# Patient Record
Sex: FEMALE | Race: WHITE | NOT HISPANIC OR LATINO
[De-identification: names, ages, dates, MRNs, and addresses within clinical notes are randomized per-mention and may not be internally consistent; named-entity substitution may affect disease eponyms.]

---

## 2018-01-22 ENCOUNTER — APPOINTMENT (OUTPATIENT)
Dept: ORTHOPEDIC SURGERY | Facility: CLINIC | Age: 68
End: 2018-01-22
Payer: MEDICARE

## 2018-01-22 VITALS — WEIGHT: 163 LBS | HEIGHT: 65 IN | BODY MASS INDEX: 27.16 KG/M2 | RESPIRATION RATE: 16 BRPM

## 2018-01-22 DIAGNOSIS — Z00.00 ENCOUNTER FOR GENERAL ADULT MEDICAL EXAMINATION W/OUT ABNORMAL FINDINGS: ICD-10-CM

## 2018-01-22 DIAGNOSIS — Z86.19 PERSONAL HISTORY OF OTHER INFECTIOUS AND PARASITIC DISEASES: ICD-10-CM

## 2018-01-22 DIAGNOSIS — M19.031 PRIMARY OSTEOARTHRITIS, RIGHT WRIST: ICD-10-CM

## 2018-01-22 DIAGNOSIS — M19.032 PRIMARY OSTEOARTHRITIS, LEFT WRIST: ICD-10-CM

## 2018-01-22 DIAGNOSIS — Z87.19 PERSONAL HISTORY OF OTHER DISEASES OF THE DIGESTIVE SYSTEM: ICD-10-CM

## 2018-01-22 DIAGNOSIS — Z78.9 OTHER SPECIFIED HEALTH STATUS: ICD-10-CM

## 2018-01-22 PROCEDURE — 99203 OFFICE O/P NEW LOW 30 MIN: CPT | Mod: 25

## 2018-01-22 PROCEDURE — 20605 DRAIN/INJ JOINT/BURSA W/O US: CPT | Mod: RT

## 2018-01-22 PROCEDURE — 73110 X-RAY EXAM OF WRIST: CPT | Mod: 50

## 2018-02-05 ENCOUNTER — CLINICAL ADVICE (OUTPATIENT)
Age: 68
End: 2018-02-05

## 2018-08-14 ENCOUNTER — APPOINTMENT (OUTPATIENT)
Dept: ORTHOPEDIC SURGERY | Facility: CLINIC | Age: 68
End: 2018-08-14
Payer: MEDICARE

## 2018-08-14 PROCEDURE — 99214 OFFICE O/P EST MOD 30 MIN: CPT

## 2018-08-30 ENCOUNTER — TRANSCRIPTION ENCOUNTER (OUTPATIENT)
Age: 68
End: 2018-08-30

## 2018-09-12 ENCOUNTER — OUTPATIENT (OUTPATIENT)
Dept: OUTPATIENT SERVICES | Facility: HOSPITAL | Age: 68
LOS: 1 days | Discharge: HOME | End: 2018-09-12

## 2018-09-12 ENCOUNTER — APPOINTMENT (OUTPATIENT)
Dept: ORTHOPEDIC SURGERY | Facility: AMBULATORY SURGERY CENTER | Age: 68
End: 2018-09-12
Payer: MEDICARE

## 2018-09-12 PROCEDURE — 64721 CARPAL TUNNEL SURGERY: CPT | Mod: RT

## 2018-09-12 PROCEDURE — 20600 DRAIN/INJ JOINT/BURSA W/O US: CPT | Mod: F5

## 2018-09-21 ENCOUNTER — APPOINTMENT (OUTPATIENT)
Dept: ORTHOPEDIC SURGERY | Facility: CLINIC | Age: 68
End: 2018-09-21
Payer: MEDICARE

## 2018-09-21 PROCEDURE — 99024 POSTOP FOLLOW-UP VISIT: CPT

## 2018-11-02 ENCOUNTER — APPOINTMENT (OUTPATIENT)
Dept: ORTHOPEDIC SURGERY | Facility: CLINIC | Age: 68
End: 2018-11-02

## 2018-11-16 ENCOUNTER — APPOINTMENT (OUTPATIENT)
Dept: ORTHOPEDIC SURGERY | Facility: CLINIC | Age: 68
End: 2018-11-16
Payer: MEDICARE

## 2018-11-16 PROCEDURE — 99024 POSTOP FOLLOW-UP VISIT: CPT

## 2019-01-30 ENCOUNTER — CLINICAL ADVICE (OUTPATIENT)
Age: 69
End: 2019-01-30

## 2019-02-11 ENCOUNTER — APPOINTMENT (OUTPATIENT)
Dept: ORTHOPEDIC SURGERY | Facility: CLINIC | Age: 69
End: 2019-02-11
Payer: MEDICARE

## 2019-02-11 VITALS — WEIGHT: 163 LBS | BODY MASS INDEX: 27.16 KG/M2 | HEIGHT: 65 IN | RESPIRATION RATE: 16 BRPM

## 2019-02-11 PROCEDURE — 99214 OFFICE O/P EST MOD 30 MIN: CPT | Mod: 25

## 2019-02-11 PROCEDURE — 20526 THER INJECTION CARP TUNNEL: CPT | Mod: LT

## 2020-06-25 ENCOUNTER — FORM ENCOUNTER (OUTPATIENT)
Age: 70
End: 2020-06-25

## 2020-06-28 ENCOUNTER — FORM ENCOUNTER (OUTPATIENT)
Age: 70
End: 2020-06-28

## 2020-06-29 ENCOUNTER — HOSPITAL ENCOUNTER (OUTPATIENT)
Dept: HOSPITAL 74 - FRADUS-SUR | Age: 70
Discharge: HOME | End: 2020-06-29
Attending: SURGERY
Payer: COMMERCIAL

## 2020-06-29 DIAGNOSIS — N60.21: ICD-10-CM

## 2020-06-29 DIAGNOSIS — N60.31: ICD-10-CM

## 2020-06-29 DIAGNOSIS — N63.41: Primary | ICD-10-CM

## 2020-06-29 DIAGNOSIS — N60.81: ICD-10-CM

## 2020-06-29 PROCEDURE — A4648 IMPLANTABLE TISSUE MARKER: HCPCS

## 2020-06-29 PROCEDURE — 0HBT3ZX EXCISION OF RIGHT BREAST, PERCUTANEOUS APPROACH, DIAGNOSTIC: ICD-10-PCS | Performed by: SURGERY

## 2020-06-29 NOTE — OP
DATE OF OPERATION:  06/09/2020

 

PREOPERATIVE DIAGNOSIS:  Right retroareolar nodule.  

 

POSTOPERATIVE DIAGNOSIS:  Right retroareolar nodule.  

 

PROCEDURE:  Right ultrasound guide core biopsy with clip placement.

 

ANESTHESIA:  Local.  

 

ATTENDING SURGEON:  Augustus Maria MD. 

 

ESTIMATED BLOOD LOSS:  Minimal.

 

COMPLICATIONS:  None.

 

DESCRIPTION OF PROCEDURE:  

 

DESCRIPTION OF PROCEDURE:  Patient was made aware of the risks and benefits of the

procedure and consented.  She was placed in supine position.  Under sterile

conditions with 2% lidocaine for local anesthesia, a small nick was made in the skin.

 Using a 13 gauge suction biopsy device, inferolateral approach, under ultrasound

guidance multiple cores were obtained and submitted to pathology.  Likewise under

ultrasound guidance, a U-shaped clip was placed into the biopsy region.  Well

tolerated by the patient.  Steri-Strips and a sterile bandage were then applied. 

Will contact her with results.  

 

 

AUGUSTUS MARIA M.D.

 

[id number]

DD: 06/29/2020 10:57

DT: 06/29/2020 22:52

Job #:  [id number]

## 2020-06-30 NOTE — PATH
Surgical Pathology Report



Patient Name:  GISELE OLMSTEAD

Accession #:  

[facility]. Rec. #:  [id number]                                                        

   /Age/Gender:  1950 (Age: 69) / F

Account:  [id number]                                                          

             Location: [facility]

Taken:  2020

Received:  2020

Reported:  2020

Physicians:  Augustus Maria M.D.

  



Specimen(s) Received

 RIGHT BREAST RETRO CORE BIOPSY 





Clinical History

Nonpalpable lesion

Ultrasound findings: Suspicious







Final Diagnosis

BREAST, RIGHT, RETRO, CORE BIOPSY:

BENIGN BREAST TISSUE SHOWING STROMAL FIBROSIS, FOCAL SCLEROSING ADENOSIS AND

FOCAL USUAL DUCTAL HYPERPLASIA (UDH).





***Electronically Signed***

Katina Wade M.D.





Gross Description

Received in formalin labeled "right breast retro," are 7 tan-yellow, cylindrical

portions of fibroadipose tissue ranging from 0.5-2.0 cm in length and averaging

0.2 cm in diameter. The specimens are submitted in toto in 2 cassettes.

Time to formalin fixation: < 1 minute

Total formalin fixation time: Approximately 8 hours.

/2020



[facility]/2020

## 2021-06-10 ENCOUNTER — TRANSCRIPTION ENCOUNTER (OUTPATIENT)
Age: 71
End: 2021-06-10

## 2021-06-16 DIAGNOSIS — Z87.891 PERSONAL HISTORY OF NICOTINE DEPENDENCE: ICD-10-CM

## 2021-06-28 ENCOUNTER — RESULT REVIEW (OUTPATIENT)
Age: 71
End: 2021-06-28

## 2021-06-28 ENCOUNTER — NON-APPOINTMENT (OUTPATIENT)
Age: 71
End: 2021-06-28

## 2021-06-28 ENCOUNTER — APPOINTMENT (OUTPATIENT)
Dept: BREAST CENTER | Facility: CLINIC | Age: 71
End: 2021-06-28
Payer: MEDICARE

## 2021-06-28 VITALS
BODY MASS INDEX: 25.49 KG/M2 | DIASTOLIC BLOOD PRESSURE: 79 MMHG | WEIGHT: 153 LBS | HEART RATE: 80 BPM | HEIGHT: 65 IN | SYSTOLIC BLOOD PRESSURE: 117 MMHG

## 2021-06-28 DIAGNOSIS — Z80.3 FAMILY HISTORY OF MALIGNANT NEOPLASM OF BREAST: ICD-10-CM

## 2021-06-28 PROCEDURE — 99213 OFFICE O/P EST LOW 20 MIN: CPT

## 2021-06-28 RX ORDER — FAMOTIDINE 10 MG/1
TABLET, FILM COATED ORAL
Refills: 0 | Status: ACTIVE | COMMUNITY

## 2021-06-28 RX ORDER — BACILLUS COAGULANS/INULIN 1B-250 MG
CAPSULE ORAL
Refills: 0 | Status: ACTIVE | COMMUNITY

## 2021-06-28 RX ORDER — ESTRADIOL 10 UG/1
TABLET, FILM COATED VAGINAL
Refills: 0 | Status: ACTIVE | COMMUNITY

## 2021-06-28 NOTE — REVIEW OF SYSTEMS
[Dry Eyes] : dryness of the eyes [Sleep Disturbances] : sleep disturbances [As Noted in HPI] : as noted in HPI [Hot Flashes] : hot flashes [Negative] : Heme/Lymph

## 2021-06-28 NOTE — HISTORY OF PRESENT ILLNESS
[FreeTextEntry1] : Paternal aunt with bilateral postmenopausal breast cancer\par Patient gene tested negative\par Fibrocystic disease\par \par Patient denies any breast masses or nipple discharge.  Patient had a mammogram today the results of which are still pending.  Last year she had a right breast biopsy which showed fibrosis and fibrocystic changes.  In 2005 she had a major duct excision that was benign.  Maternal uncle had breast cancer.

## 2022-07-29 ENCOUNTER — APPOINTMENT (OUTPATIENT)
Dept: BREAST CENTER | Facility: CLINIC | Age: 72
End: 2022-07-29

## 2022-07-29 VITALS
OXYGEN SATURATION: 97 % | HEIGHT: 64 IN | HEART RATE: 78 BPM | WEIGHT: 154 LBS | BODY MASS INDEX: 26.29 KG/M2 | SYSTOLIC BLOOD PRESSURE: 121 MMHG | DIASTOLIC BLOOD PRESSURE: 83 MMHG

## 2022-07-29 DIAGNOSIS — N60.11 DIFFUSE CYSTIC MASTOPATHY OF LEFT BREAST: ICD-10-CM

## 2022-07-29 DIAGNOSIS — N63.10 UNSPECIFIED LUMP IN THE RIGHT BREAST, UNSPECIFIED QUADRANT: ICD-10-CM

## 2022-07-29 DIAGNOSIS — N60.12 DIFFUSE CYSTIC MASTOPATHY OF LEFT BREAST: ICD-10-CM

## 2022-07-29 DIAGNOSIS — R92.2 INCONCLUSIVE MAMMOGRAM: ICD-10-CM

## 2022-07-29 PROCEDURE — 99213 OFFICE O/P EST LOW 20 MIN: CPT

## 2022-07-29 NOTE — PAST MEDICAL HISTORY
[Postmenopausal] : The patient is postmenopausal [Menarche Age ____] : age at menarche was [unfilled] [History of Hormone Replacement Treatment] : has no history of hormone replacement treatment [Approximately ___] : the LMP was approximately [unfilled] [Total Preg ___] : G[unfilled] [Live Births ___] : P[unfilled]  [Abortions ___] : Abortions:[unfilled] [Age At Live Birth ___] : Age at live birth: [unfilled]

## 2022-07-29 NOTE — HISTORY OF PRESENT ILLNESS
[FreeTextEntry1] : Maternal aunt with bilateral postmenopausal breast cancer\par Maternal uncle had breast cancer.\par Patient gene tested negative\par Fibrocystic disease\par \par Patient denies any breast masses or nipple discharge.  Patient had a mammogram which was unremarkable but the ultrasound showed a new benign appearing flat hypoechoic nodule in the right breast 6 o'clock position, 3 cm from the nipple, BI-RADS 3. \par Status post right breast biopsy which showed fibrosis and fibrocystic changes.  In 2005 she had a major duct excision that was benign.

## 2022-12-12 ENCOUNTER — NON-APPOINTMENT (OUTPATIENT)
Age: 72
End: 2022-12-12

## 2022-12-12 ENCOUNTER — APPOINTMENT (OUTPATIENT)
Dept: GASTROENTEROLOGY | Facility: CLINIC | Age: 72
End: 2022-12-12

## 2022-12-12 VITALS
DIASTOLIC BLOOD PRESSURE: 71 MMHG | WEIGHT: 150 LBS | HEIGHT: 64 IN | OXYGEN SATURATION: 98 % | BODY MASS INDEX: 25.61 KG/M2 | SYSTOLIC BLOOD PRESSURE: 114 MMHG | HEART RATE: 87 BPM

## 2022-12-12 DIAGNOSIS — K21.9 GASTRO-ESOPHAGEAL REFLUX DISEASE W/OUT ESOPHAGITIS: ICD-10-CM

## 2022-12-12 DIAGNOSIS — K58.2 MIXED IRRITABLE BOWEL SYNDROME: ICD-10-CM

## 2022-12-12 PROCEDURE — 99204 OFFICE O/P NEW MOD 45 MIN: CPT

## 2022-12-12 RX ORDER — AMITRIPTYLINE HYDROCHLORIDE 25 MG/1
25 TABLET, FILM COATED ORAL
Refills: 0 | Status: DISCONTINUED | COMMUNITY
End: 2022-12-12

## 2022-12-12 RX ORDER — ACETAMINOPHEN AND CODEINE 300; 30 MG/1; MG/1
300-30 TABLET ORAL
Qty: 15 | Refills: 0 | Status: DISCONTINUED | COMMUNITY
Start: 2018-09-11 | End: 2022-12-12

## 2022-12-12 RX ORDER — CHROMIUM 200 MCG
TABLET ORAL
Refills: 0 | Status: DISCONTINUED | COMMUNITY
End: 2022-12-12

## 2022-12-12 NOTE — ASSESSMENT
[FreeTextEntry1] : Will plan on an upper endoscopy for GERD.  Explained risks/benefits/alternatives including not limited to bleeding, infection, perforation, missed lesions, anesthesia complications.  Patient understands and agrees, all questions answered.\par \par Will order a gastric emptying study r/o gastroparesis.\par \par May need a repeat X-ray esophagram to assess the size of her hiatal hernia.\par \par Will give a trial of fiber supplements, such as Metamucil or Benefiber, one dose daily in an attempt to bulk up and soften stool.\par \par Could give a trial of PRN levsin.\par \par May need eventual fecal elastase.

## 2022-12-12 NOTE — HISTORY OF PRESENT ILLNESS
[FreeTextEntry1] : Ms. Ledezma is a pleasant 72F h/o cholecystectomy, chronic GERD, IBS, hiatal hernia, osteopenia who presents to establish care.\par \par She has a very long h/o heartburn and dyspepsia that at times can be very bothersome, has had symptoms for multiple decades.\par \par She was followed for a long time by Dr. Aj in [facility], however her gastroenterologist recently retired.\par \par Last EGD was around 1.5 years ago, reports has had a hiatal hernia, esophagitis, gastritis.\par \par Last colonoscopy was 10/18, no polyps at that time.\par \par She will frequently have heartburn, worst at night, and burning discomfort in her epigastric area.  Sometimes with associated regurgitation.  Worsened by acidic foods, drinking wine.  Has had dysphagia in the past, comes and goes.\par \par Symptoms are the worst in the fall and spring, strong seasonal component.\par \justin Has been on a PPI for decades, was on ranitidine, now on famotidine, thinks it helps, not sure.\justin lacy Also has a long h/o alternating constipation and diarrhea, exacerbated by certain foods she eats. Has never tried anything for this, does have cramps prior to having a BM when she has diarrhea. No blood or mucus, no black stool.  Has not tried fiber supplements.\justin lacy Had an X-ray esophagram, has never had a gastric emptying study.\par regino Was on amitriptyline for around 30 years, stopped due to concern for memory impairment, is not sure it affected her GI symptoms.\par \justin Does not smoke, drinks socially.\par \par No FHx of any GI malignancies.

## 2022-12-15 ENCOUNTER — APPOINTMENT (OUTPATIENT)
Dept: ORTHOPEDIC SURGERY | Facility: CLINIC | Age: 72
End: 2022-12-15

## 2022-12-15 VITALS — RESPIRATION RATE: 16 BRPM | WEIGHT: 150 LBS | BODY MASS INDEX: 25.61 KG/M2 | HEIGHT: 64 IN

## 2022-12-15 DIAGNOSIS — R20.0 ANESTHESIA OF SKIN: ICD-10-CM

## 2022-12-15 PROCEDURE — 73070 X-RAY EXAM OF ELBOW: CPT | Mod: RT

## 2022-12-15 PROCEDURE — 73110 X-RAY EXAM OF WRIST: CPT | Mod: 50

## 2022-12-15 PROCEDURE — 99204 OFFICE O/P NEW MOD 45 MIN: CPT

## 2023-01-04 ENCOUNTER — RESULT REVIEW (OUTPATIENT)
Age: 73
End: 2023-01-04

## 2023-01-24 ENCOUNTER — APPOINTMENT (OUTPATIENT)
Dept: ORTHOPEDIC SURGERY | Facility: CLINIC | Age: 73
End: 2023-01-24
Payer: MEDICARE

## 2023-01-24 DIAGNOSIS — G56.01 CARPAL TUNNEL SYNDROME, RIGHT UPPER LIMB: ICD-10-CM

## 2023-01-24 DIAGNOSIS — G56.02 CARPAL TUNNEL SYNDROME, LEFT UPPER LIMB: ICD-10-CM

## 2023-01-24 PROCEDURE — 99442: CPT | Mod: 95

## 2023-01-25 ENCOUNTER — RESULT REVIEW (OUTPATIENT)
Age: 73
End: 2023-01-25

## 2023-02-10 ENCOUNTER — RESULT REVIEW (OUTPATIENT)
Age: 73
End: 2023-02-10

## 2023-02-12 ENCOUNTER — RESULT REVIEW (OUTPATIENT)
Age: 73
End: 2023-02-12

## 2023-02-13 ENCOUNTER — APPOINTMENT (OUTPATIENT)
Dept: GASTROENTEROLOGY | Facility: HOSPITAL | Age: 73
End: 2023-02-13

## 2023-07-24 ENCOUNTER — RESULT REVIEW (OUTPATIENT)
Age: 73
End: 2023-07-24

## 2023-07-24 ENCOUNTER — APPOINTMENT (OUTPATIENT)
Dept: BREAST CENTER | Facility: CLINIC | Age: 73
End: 2023-07-24
Payer: MEDICARE

## 2023-07-24 VITALS
HEART RATE: 64 BPM | DIASTOLIC BLOOD PRESSURE: 77 MMHG | OXYGEN SATURATION: 99 % | HEIGHT: 64 IN | BODY MASS INDEX: 26.29 KG/M2 | SYSTOLIC BLOOD PRESSURE: 119 MMHG | WEIGHT: 154 LBS

## 2023-07-24 DIAGNOSIS — Z80.3 FAMILY HISTORY OF MALIGNANT NEOPLASM OF BREAST: ICD-10-CM

## 2023-07-24 PROCEDURE — 99213 OFFICE O/P EST LOW 20 MIN: CPT

## 2023-07-25 PROBLEM — Z80.3 FAMILY HISTORY OF BREAST CANCER: Status: ACTIVE | Noted: 2021-06-16

## 2023-07-25 NOTE — REVIEW OF SYSTEMS
[Negative] : Heme/Lymph [Dry Eyes] : dryness of the eyes [Sleep Disturbances] : sleep disturbances [As Noted in HPI] : as noted in HPI [Hot Flashes] : hot flashes

## 2023-11-13 RX ORDER — FAMOTIDINE 40 MG/1
40 TABLET, FILM COATED ORAL
Qty: 28 | Refills: 1 | Status: ACTIVE | COMMUNITY
Start: 2023-10-30 | End: 1900-01-01

## 2024-01-30 DIAGNOSIS — Z12.11 ENCOUNTER FOR SCREENING FOR MALIGNANT NEOPLASM OF COLON: ICD-10-CM

## 2024-01-30 RX ORDER — SODIUM SULFATE, POTASSIUM SULFATE AND MAGNESIUM SULFATE 1.6; 3.13; 17.5 G/177ML; G/177ML; G/177ML
17.5-3.13-1.6 SOLUTION ORAL
Qty: 1 | Refills: 0 | Status: ACTIVE | COMMUNITY
Start: 2024-01-30 | End: 1900-01-01

## 2024-04-03 ENCOUNTER — APPOINTMENT (OUTPATIENT)
Dept: GASTROENTEROLOGY | Facility: HOSPITAL | Age: 74
End: 2024-04-03

## 2024-08-19 DIAGNOSIS — K21.9 GASTRO-ESOPHAGEAL REFLUX DISEASE W/OUT ESOPHAGITIS: ICD-10-CM

## 2024-08-19 RX ORDER — FAMOTIDINE 40 MG/1
40 TABLET, FILM COATED ORAL TWICE DAILY
Qty: 180 | Refills: 3 | Status: ACTIVE | COMMUNITY
Start: 2024-08-19 | End: 1900-01-01

## 2024-08-30 ENCOUNTER — RESULT REVIEW (OUTPATIENT)
Age: 74
End: 2024-08-30

## 2024-08-30 ENCOUNTER — APPOINTMENT (OUTPATIENT)
Dept: BREAST CENTER | Facility: CLINIC | Age: 74
End: 2024-08-30
Payer: MEDICARE

## 2024-08-30 VITALS
OXYGEN SATURATION: 100 % | DIASTOLIC BLOOD PRESSURE: 75 MMHG | WEIGHT: 153 LBS | BODY MASS INDEX: 25.8 KG/M2 | HEART RATE: 62 BPM | SYSTOLIC BLOOD PRESSURE: 119 MMHG | HEIGHT: 64.5 IN

## 2024-08-30 DIAGNOSIS — Z80.3 FAMILY HISTORY OF MALIGNANT NEOPLASM OF BREAST: ICD-10-CM

## 2024-08-30 DIAGNOSIS — R92.30 DENSE BREASTS, UNSPECIFIED: ICD-10-CM

## 2024-08-30 DIAGNOSIS — Z12.31 ENCOUNTER FOR SCREENING MAMMOGRAM FOR MALIGNANT NEOPLASM OF BREAST: ICD-10-CM

## 2024-08-30 PROCEDURE — 99212 OFFICE O/P EST SF 10 MIN: CPT

## 2024-08-30 NOTE — HISTORY OF PRESENT ILLNESS
[FreeTextEntry1] : Maternal aunt with bilateral postmenopausal breast cancer Maternal uncle had breast cancer. Patient gene tested negative Fibrocystic disease  Patient denies any breast masses or nipple discharge.  Patient had a mammogram and ultrasound that was unremarkable, BI-RADS 2.  January she had a follow-up right ultrasound that was unremarkable with benign stability. Status post right breast biopsy which showed fibrosis and fibrocystic changes.  In 2005 she had a major duct excision that was benign.    No change in her health.

## 2025-03-14 ENCOUNTER — NON-APPOINTMENT (OUTPATIENT)
Age: 75
End: 2025-03-14

## 2025-03-17 ENCOUNTER — APPOINTMENT (OUTPATIENT)
Dept: GASTROENTEROLOGY | Facility: CLINIC | Age: 75
End: 2025-03-17
Payer: MEDICARE

## 2025-03-17 VITALS
SYSTOLIC BLOOD PRESSURE: 110 MMHG | RESPIRATION RATE: 16 BRPM | DIASTOLIC BLOOD PRESSURE: 64 MMHG | HEART RATE: 86 BPM | HEIGHT: 64.5 IN | OXYGEN SATURATION: 98 % | BODY MASS INDEX: 26.14 KG/M2 | WEIGHT: 155 LBS

## 2025-03-17 DIAGNOSIS — K58.2 MIXED IRRITABLE BOWEL SYNDROME: ICD-10-CM

## 2025-03-17 DIAGNOSIS — Z12.11 ENCOUNTER FOR SCREENING FOR MALIGNANT NEOPLASM OF COLON: ICD-10-CM

## 2025-03-17 DIAGNOSIS — Z87.19 PERSONAL HISTORY OF OTHER DISEASES OF THE DIGESTIVE SYSTEM: ICD-10-CM

## 2025-03-17 DIAGNOSIS — K21.9 GASTRO-ESOPHAGEAL REFLUX DISEASE W/OUT ESOPHAGITIS: ICD-10-CM

## 2025-03-17 PROCEDURE — 99214 OFFICE O/P EST MOD 30 MIN: CPT

## 2025-03-17 PROCEDURE — G2211 COMPLEX E/M VISIT ADD ON: CPT

## 2025-04-16 RX ORDER — HYOSCYAMINE SULFATE 0.12 MG/1
0.12 TABLET, ORALLY DISINTEGRATING ORAL
Qty: 60 | Refills: 3 | Status: ACTIVE | COMMUNITY
Start: 2025-04-16 | End: 1900-01-01

## 2025-04-18 RX ORDER — DICYCLOMINE HYDROCHLORIDE 10 MG/1
10 CAPSULE ORAL
Qty: 120 | Refills: 3 | Status: ACTIVE | COMMUNITY
Start: 2025-04-18 | End: 1900-01-01

## 2025-04-23 ENCOUNTER — APPOINTMENT (OUTPATIENT)
Dept: ORTHOPEDIC SURGERY | Facility: CLINIC | Age: 75
End: 2025-04-23

## 2025-04-23 DIAGNOSIS — M19.032 PRIMARY OSTEOARTHRITIS, LEFT WRIST: ICD-10-CM

## 2025-04-23 DIAGNOSIS — M19.031 PRIMARY OSTEOARTHRITIS, RIGHT WRIST: ICD-10-CM

## 2025-04-23 DIAGNOSIS — G56.21 LESION OF ULNAR NERVE, RIGHT UPPER LIMB: ICD-10-CM

## 2025-04-23 DIAGNOSIS — G56.01 CARPAL TUNNEL SYNDROME, RIGHT UPPER LIMB: ICD-10-CM

## 2025-04-23 PROCEDURE — 20600 DRAIN/INJ JOINT/BURSA W/O US: CPT | Mod: 50

## 2025-04-23 PROCEDURE — 73110 X-RAY EXAM OF WRIST: CPT | Mod: 50

## 2025-04-23 PROCEDURE — 73070 X-RAY EXAM OF ELBOW: CPT | Mod: RT

## 2025-04-23 PROCEDURE — 99214 OFFICE O/P EST MOD 30 MIN: CPT | Mod: 25

## 2025-04-23 PROCEDURE — 20605 DRAIN/INJ JOINT/BURSA W/O US: CPT | Mod: RT

## 2025-05-08 RX ORDER — CIPROFLOXACIN HYDROCHLORIDE 500 MG/1
500 TABLET, FILM COATED ORAL TWICE DAILY
Qty: 6 | Refills: 0 | Status: ACTIVE | COMMUNITY
Start: 2025-05-08 | End: 1900-01-01

## 2025-05-09 ENCOUNTER — NON-APPOINTMENT (OUTPATIENT)
Age: 75
End: 2025-05-09

## 2025-09-15 ENCOUNTER — NON-APPOINTMENT (OUTPATIENT)
Age: 75
End: 2025-09-15

## 2025-09-17 ENCOUNTER — NON-APPOINTMENT (OUTPATIENT)
Age: 75
End: 2025-09-17

## 2025-09-19 ENCOUNTER — RESULT REVIEW (OUTPATIENT)
Age: 75
End: 2025-09-19

## 2025-09-19 ENCOUNTER — APPOINTMENT (OUTPATIENT)
Dept: BREAST CENTER | Facility: CLINIC | Age: 75
End: 2025-09-19
Payer: MEDICARE

## 2025-09-19 VITALS — HEIGHT: 64 IN | WEIGHT: 150 LBS | BODY MASS INDEX: 25.61 KG/M2

## 2025-09-19 DIAGNOSIS — N60.12 DIFFUSE CYSTIC MASTOPATHY OF LEFT BREAST: ICD-10-CM

## 2025-09-19 DIAGNOSIS — N60.11 DIFFUSE CYSTIC MASTOPATHY OF LEFT BREAST: ICD-10-CM

## 2025-09-19 DIAGNOSIS — Z80.3 FAMILY HISTORY OF MALIGNANT NEOPLASM OF BREAST: ICD-10-CM

## 2025-09-19 PROCEDURE — 99212 OFFICE O/P EST SF 10 MIN: CPT

## 2025-09-19 RX ORDER — TRAZODONE HYDROCHLORIDE 50 MG/1
50 TABLET ORAL
Refills: 0 | Status: ACTIVE | COMMUNITY

## 2025-09-25 PROBLEM — K21.9 CHRONIC GERD: Status: RESOLVED | Noted: 2018-01-22 | Resolved: 2025-09-25
